# Patient Record
Sex: MALE | URBAN - METROPOLITAN AREA
[De-identification: names, ages, dates, MRNs, and addresses within clinical notes are randomized per-mention and may not be internally consistent; named-entity substitution may affect disease eponyms.]

---

## 2024-01-27 ENCOUNTER — APPOINTMENT (RX ONLY)
Dept: URBAN - METROPOLITAN AREA CLINIC 151 | Facility: CLINIC | Age: 29
Setting detail: DERMATOLOGY
End: 2024-01-27

## 2024-01-27 DIAGNOSIS — L21.8 OTHER SEBORRHEIC DERMATITIS: ICD-10-CM

## 2024-01-27 DIAGNOSIS — L66.2 FOLLICULITIS DECALVANS: ICD-10-CM | Status: INADEQUATELY CONTROLLED

## 2024-01-27 DIAGNOSIS — L20.89 OTHER ATOPIC DERMATITIS: ICD-10-CM

## 2024-01-27 PROCEDURE — 99204 OFFICE O/P NEW MOD 45 MIN: CPT

## 2024-01-27 PROCEDURE — ? PRESCRIPTION

## 2024-01-27 PROCEDURE — ? COUNSELING

## 2024-01-27 PROCEDURE — ? DIAGNOSIS COMMENT

## 2024-01-27 PROCEDURE — ? PHOTO-DOCUMENTATION

## 2024-01-27 RX ORDER — TACROLIMUS 1 MG/G
OINTMENT TOPICAL BID
Qty: 60 | Refills: 3 | Status: ERX | COMMUNITY
Start: 2024-01-27

## 2024-01-27 RX ORDER — CLOBETASOL PROPIONATE 0.5 MG/G
OINTMENT TOPICAL
Qty: 60 | Refills: 5 | Status: ERX | COMMUNITY
Start: 2024-01-27

## 2024-01-27 RX ORDER — DOXYCYCLINE 100 MG/1
TABLET, FILM COATED ORAL
Qty: 60 | Refills: 3 | Status: ERX | COMMUNITY
Start: 2024-01-27

## 2024-01-27 RX ADMIN — DOXYCYCLINE: 100 TABLET, FILM COATED ORAL at 00:00

## 2024-01-27 RX ADMIN — TACROLIMUS: 1 OINTMENT TOPICAL at 00:00

## 2024-01-27 RX ADMIN — CLOBETASOL PROPIONATE: 0.5 OINTMENT TOPICAL at 00:00

## 2024-01-27 ASSESSMENT — LOCATION DETAILED DESCRIPTION DERM: LOCATION DETAILED: LEFT MEDIAL FRONTAL SCALP

## 2024-01-27 ASSESSMENT — SEVERITY ASSESSMENT: SEVERITY: MODERATE

## 2024-01-27 ASSESSMENT — LOCATION SIMPLE DESCRIPTION DERM: LOCATION SIMPLE: LEFT SCALP

## 2024-01-27 ASSESSMENT — LOCATION ZONE DERM: LOCATION ZONE: SCALP

## 2024-01-27 NOTE — HPI: OTHER
Condition:: Rash
Please Describe Your Condition:: Terri is a 28-year-old type-V skin black gentleman with a past medical history significant for a rash in his scalp on and off for the past approximately 14 years that has been treated in the past with either rifampin or Keflex.  He notes today he is not as bad as he has been in the past where he has had discharge and more inflammation.  More recently, he developed a rash on his chin area that appeared approximately a week ago.  He denies the introduction of any new creams but does report recently he changed his routine in terms of the order which he shaves and moisturizes, which he feels may have contributed to this.  Over the last week, he has been applying Aquaphor to the affected area with minimal impact.  He does report that in addition on his scalp he has used topical steroids as well.  He reports seeing multiple dermatologists in the past for this, but has not had sustained improvement.  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  There is hyperpigmented eczematous plaque on his chin and medial cheeks as well as hyperpigmented eczematous plaque on his right neck and lichenified hyperpigmented plaque on his scrotum.

## 2024-01-27 NOTE — PROCEDURE: DIAGNOSIS COMMENT
Detail Level: Zone
Render Risk Assessment In Note?: yes
Comment: Dictated\\n\\nRx antibiotics, topical steroids in past by outside providers.\\nStart tacrolimus 0.1% ointment QD-BID as needed to face, groin, axillae.
Comment: Dictated.\\n\\nHx of Folliculitis Decalvans. Used cephalexin and rimpafin in past.\\nStart Doxycycline 100mg PO BID x 3 months\\nDiscussed future hair loss if not treated.
Comment: Dictated\\n\\nStart tacrolimus 0.1% ointment QD-BID as needed to face, axillae, scrotum.\\nStart clobetasol 0.05% ointment for neck BID x 2 weeks.